# Patient Record
Sex: FEMALE | Race: WHITE
[De-identification: names, ages, dates, MRNs, and addresses within clinical notes are randomized per-mention and may not be internally consistent; named-entity substitution may affect disease eponyms.]

---

## 2019-11-06 ENCOUNTER — HOSPITAL ENCOUNTER (INPATIENT)
Dept: HOSPITAL 53 - M LDI | Age: 24
LOS: 3 days | Discharge: HOME | DRG: 560 | End: 2019-11-09
Admitting: ADVANCED PRACTICE MIDWIFE
Payer: SELF-PAY

## 2019-11-06 VITALS — DIASTOLIC BLOOD PRESSURE: 63 MMHG | SYSTOLIC BLOOD PRESSURE: 102 MMHG

## 2019-11-06 VITALS — DIASTOLIC BLOOD PRESSURE: 83 MMHG | SYSTOLIC BLOOD PRESSURE: 133 MMHG

## 2019-11-06 VITALS — SYSTOLIC BLOOD PRESSURE: 89 MMHG | DIASTOLIC BLOOD PRESSURE: 53 MMHG

## 2019-11-06 VITALS — SYSTOLIC BLOOD PRESSURE: 121 MMHG | DIASTOLIC BLOOD PRESSURE: 80 MMHG

## 2019-11-06 VITALS — HEIGHT: 64 IN | BODY MASS INDEX: 42.49 KG/M2 | WEIGHT: 248.9 LBS

## 2019-11-06 VITALS — DIASTOLIC BLOOD PRESSURE: 86 MMHG | SYSTOLIC BLOOD PRESSURE: 134 MMHG

## 2019-11-06 VITALS — DIASTOLIC BLOOD PRESSURE: 77 MMHG | SYSTOLIC BLOOD PRESSURE: 117 MMHG

## 2019-11-06 VITALS — DIASTOLIC BLOOD PRESSURE: 72 MMHG | SYSTOLIC BLOOD PRESSURE: 117 MMHG

## 2019-11-06 VITALS — SYSTOLIC BLOOD PRESSURE: 110 MMHG | DIASTOLIC BLOOD PRESSURE: 70 MMHG

## 2019-11-06 VITALS — SYSTOLIC BLOOD PRESSURE: 121 MMHG | DIASTOLIC BLOOD PRESSURE: 67 MMHG

## 2019-11-06 VITALS — DIASTOLIC BLOOD PRESSURE: 101 MMHG | SYSTOLIC BLOOD PRESSURE: 143 MMHG

## 2019-11-06 VITALS — SYSTOLIC BLOOD PRESSURE: 114 MMHG | DIASTOLIC BLOOD PRESSURE: 55 MMHG

## 2019-11-06 VITALS — SYSTOLIC BLOOD PRESSURE: 123 MMHG | DIASTOLIC BLOOD PRESSURE: 74 MMHG

## 2019-11-06 DIAGNOSIS — Z3A.39: ICD-10-CM

## 2019-11-06 LAB
ALT SERPL W P-5'-P-CCNC: 25 U/L (ref 12–78)
BILIRUB SERPL-MCNC: 0.2 MG/DL (ref 0.2–1)
CREAT SERPL-MCNC: 0.57 MG/DL (ref 0.55–1.3)
CREAT UR-MCNC: 109 MG/DL
GFR SERPL CREATININE-BSD FRML MDRD: > 60 ML/MIN/{1.73_M2} (ref 60–?)
HCT VFR BLD AUTO: 39.5 % (ref 36–47)
HGB BLD-MCNC: 13.4 G/DL (ref 12–15.5)
LDH SERPL L TO P-CCNC: 218 U/L (ref 84–246)
MCH RBC QN AUTO: 27.9 PG (ref 27–33)
MCHC RBC AUTO-ENTMCNC: 33.9 G/DL (ref 32–36.5)
MCV RBC AUTO: 82.3 FL (ref 80–96)
PLATELET # BLD AUTO: 268 10^3/UL (ref 150–450)
PROT UR-MCNC: 16.2 MG/DL (ref 0–12)
RBC # BLD AUTO: 4.8 10^6/UL (ref 4–5.4)
URATE SERPL-MCNC: 3.6 MG/DL (ref 2.6–6)
WBC # BLD AUTO: 12 10^3/UL (ref 4–10)

## 2019-11-06 PROCEDURE — 3E033VJ INTRODUCTION OF OTHER HORMONE INTO PERIPHERAL VEIN, PERCUTANEOUS APPROACH: ICD-10-PCS | Performed by: ADVANCED PRACTICE MIDWIFE

## 2019-11-06 RX ADMIN — MISOPROSTOL SCH MCG: 100 TABLET ORAL at 22:09

## 2019-11-06 RX ADMIN — MISOPROSTOL SCH MCG: 100 TABLET ORAL at 18:07

## 2019-11-06 NOTE — HPE
DATE OF ADMISSION:  2019

 

Trixie is a 23-year-old  1, para 0, at 39+ weeks gestation, She has an

estimated date of confinement (EDC) of 2019 based on her last menstrual

period and supported by second trimester ultrasound.  She presents to labor and

delivery for induction of labor due to gestational hypertension.  She is of [facility]

culture.  She does report some scant bloody show today and an occasional mild

contraction.  She denies leakage of fluid and the fetus has been active.  Her

prenatal care was initiated at 22 weeks.  She had a three office visits at [facility], noted to have elevated blood pressure at 29 weeks gestation.

No pre-eclamptic labs with a spot urine were performed at that time.

 

PRENATAL LABORATORIES:

A negative, antibody screen negative, rubella nonimmune, VDRL nonreactive.  Urine

culture contaminated.  Hepatitis B surface antigen negative, HIV negative.

Hepatitis C antibody nonreactive.  Gonorrhea and chlamydia negative.  She did not

have her group B Streptococcus (GBS) obtained.  She did not have a repeat urine

culture.  No gestational diabetic screening performed as well.

 

OBSTETRICAL HISTORY:  Primigravida.

 

PAST MEDICAL HISTORY:

1.  Anxiety and depression.

2.  Childhood pertussis and varicella.

 

SURGERIES:  None.

 

FAMILY HISTORY:  Hypertension.

 

SOCIAL HISTORY:  The patient is .  Her  and her mother at bedside

and supportive.  She is a nonsmoker.  She denies alcohol and drug use.  No

history of any sexually transmitted infections and no history of abuse.

 

ALLERGIES:  No known drug allergies.

 

CURRENT MEDICATIONS:

-  magnesium

-  calcium

-  acidophilus

-  multivitamin

 

OBJECTIVE:

Complete vital signs have not been obtained at this time.  Blood pressure

143/101.  Repeat blood pressure 114/55.  Fetal heart rate is 130 with moderate

variability, positive accelerations, negative decelerations.  There is no pattern

of regular contractions.  Her abdomen is gravid, cephalic presentation.

Estimated fetal weight 8 pounds.

STERILE VAGINAL EXAM:  1 cm dilated, 80% effaced, ballottable station, posterior

and soft.  No show with the exam.  Cephalic presentation was confirmed with

bedside ultrasound.

 

ASSESSMENT:

Intrauterine pregnancy at 39+ weeks gestation.  Fetal heart rate category one.

Gestational hypertension.

 

PLAN:

Admit the patient to labor and delivery.  Routine labs with the addition of

pre-eclamptic profile.  Out of bed ad lamont.  Regular diet at this time.  Will

start antibiotics for GBS unknown prophylaxis once the patient is in active

labor.  I plan misoprostol 50 mcg by mouth every 4 hours for cervical ripening.

Likely will start Pitocin.  The patient is uncertain how she will cope with her

labor at this time.  We did discuss a rupture of membranes (ROM) to augment her

labor when she is in active labor.  The patient and her family have had their

questions answered.  Risks, benefits and alternatives have been reviewed.  She

has been verbally consented for emergency surgery and blood products, if

necessary.  I do anticipate cervical ripening.

## 2019-11-07 VITALS — DIASTOLIC BLOOD PRESSURE: 64 MMHG | SYSTOLIC BLOOD PRESSURE: 120 MMHG

## 2019-11-07 VITALS — SYSTOLIC BLOOD PRESSURE: 119 MMHG | DIASTOLIC BLOOD PRESSURE: 75 MMHG

## 2019-11-07 VITALS — SYSTOLIC BLOOD PRESSURE: 141 MMHG | DIASTOLIC BLOOD PRESSURE: 84 MMHG

## 2019-11-07 VITALS — DIASTOLIC BLOOD PRESSURE: 87 MMHG | SYSTOLIC BLOOD PRESSURE: 135 MMHG

## 2019-11-07 VITALS — DIASTOLIC BLOOD PRESSURE: 70 MMHG | SYSTOLIC BLOOD PRESSURE: 119 MMHG

## 2019-11-07 VITALS — SYSTOLIC BLOOD PRESSURE: 114 MMHG | DIASTOLIC BLOOD PRESSURE: 84 MMHG

## 2019-11-07 VITALS — DIASTOLIC BLOOD PRESSURE: 83 MMHG | SYSTOLIC BLOOD PRESSURE: 115 MMHG

## 2019-11-07 VITALS — DIASTOLIC BLOOD PRESSURE: 85 MMHG | SYSTOLIC BLOOD PRESSURE: 150 MMHG

## 2019-11-07 VITALS — DIASTOLIC BLOOD PRESSURE: 91 MMHG | SYSTOLIC BLOOD PRESSURE: 125 MMHG

## 2019-11-07 VITALS — SYSTOLIC BLOOD PRESSURE: 111 MMHG | DIASTOLIC BLOOD PRESSURE: 57 MMHG

## 2019-11-07 VITALS — SYSTOLIC BLOOD PRESSURE: 133 MMHG | DIASTOLIC BLOOD PRESSURE: 91 MMHG

## 2019-11-07 VITALS — SYSTOLIC BLOOD PRESSURE: 124 MMHG | DIASTOLIC BLOOD PRESSURE: 76 MMHG

## 2019-11-07 VITALS — SYSTOLIC BLOOD PRESSURE: 137 MMHG | DIASTOLIC BLOOD PRESSURE: 89 MMHG

## 2019-11-07 VITALS — SYSTOLIC BLOOD PRESSURE: 113 MMHG | DIASTOLIC BLOOD PRESSURE: 68 MMHG

## 2019-11-07 VITALS — SYSTOLIC BLOOD PRESSURE: 144 MMHG | DIASTOLIC BLOOD PRESSURE: 72 MMHG

## 2019-11-07 VITALS — SYSTOLIC BLOOD PRESSURE: 154 MMHG | DIASTOLIC BLOOD PRESSURE: 89 MMHG

## 2019-11-07 VITALS — SYSTOLIC BLOOD PRESSURE: 134 MMHG | DIASTOLIC BLOOD PRESSURE: 63 MMHG

## 2019-11-07 VITALS — SYSTOLIC BLOOD PRESSURE: 118 MMHG | DIASTOLIC BLOOD PRESSURE: 81 MMHG

## 2019-11-07 VITALS — SYSTOLIC BLOOD PRESSURE: 140 MMHG | DIASTOLIC BLOOD PRESSURE: 94 MMHG

## 2019-11-07 VITALS — DIASTOLIC BLOOD PRESSURE: 68 MMHG | SYSTOLIC BLOOD PRESSURE: 114 MMHG

## 2019-11-07 VITALS — SYSTOLIC BLOOD PRESSURE: 124 MMHG | DIASTOLIC BLOOD PRESSURE: 78 MMHG

## 2019-11-07 VITALS — DIASTOLIC BLOOD PRESSURE: 71 MMHG | SYSTOLIC BLOOD PRESSURE: 131 MMHG

## 2019-11-07 VITALS — DIASTOLIC BLOOD PRESSURE: 66 MMHG | SYSTOLIC BLOOD PRESSURE: 98 MMHG

## 2019-11-07 VITALS — DIASTOLIC BLOOD PRESSURE: 89 MMHG | SYSTOLIC BLOOD PRESSURE: 149 MMHG

## 2019-11-07 VITALS — DIASTOLIC BLOOD PRESSURE: 65 MMHG | SYSTOLIC BLOOD PRESSURE: 123 MMHG

## 2019-11-07 VITALS — DIASTOLIC BLOOD PRESSURE: 74 MMHG | SYSTOLIC BLOOD PRESSURE: 121 MMHG

## 2019-11-07 VITALS — DIASTOLIC BLOOD PRESSURE: 66 MMHG | SYSTOLIC BLOOD PRESSURE: 107 MMHG

## 2019-11-07 VITALS — SYSTOLIC BLOOD PRESSURE: 97 MMHG | DIASTOLIC BLOOD PRESSURE: 50 MMHG

## 2019-11-07 VITALS — DIASTOLIC BLOOD PRESSURE: 100 MMHG | SYSTOLIC BLOOD PRESSURE: 133 MMHG

## 2019-11-07 VITALS — DIASTOLIC BLOOD PRESSURE: 69 MMHG | SYSTOLIC BLOOD PRESSURE: 112 MMHG

## 2019-11-07 VITALS — SYSTOLIC BLOOD PRESSURE: 113 MMHG | DIASTOLIC BLOOD PRESSURE: 63 MMHG

## 2019-11-07 VITALS — DIASTOLIC BLOOD PRESSURE: 85 MMHG | SYSTOLIC BLOOD PRESSURE: 121 MMHG

## 2019-11-07 VITALS — SYSTOLIC BLOOD PRESSURE: 129 MMHG | DIASTOLIC BLOOD PRESSURE: 80 MMHG

## 2019-11-07 VITALS — SYSTOLIC BLOOD PRESSURE: 169 MMHG | DIASTOLIC BLOOD PRESSURE: 75 MMHG

## 2019-11-07 VITALS — SYSTOLIC BLOOD PRESSURE: 125 MMHG | DIASTOLIC BLOOD PRESSURE: 80 MMHG

## 2019-11-07 VITALS — SYSTOLIC BLOOD PRESSURE: 113 MMHG | DIASTOLIC BLOOD PRESSURE: 84 MMHG

## 2019-11-07 VITALS — SYSTOLIC BLOOD PRESSURE: 117 MMHG | DIASTOLIC BLOOD PRESSURE: 69 MMHG

## 2019-11-07 VITALS — SYSTOLIC BLOOD PRESSURE: 101 MMHG | DIASTOLIC BLOOD PRESSURE: 64 MMHG

## 2019-11-07 VITALS — DIASTOLIC BLOOD PRESSURE: 76 MMHG | SYSTOLIC BLOOD PRESSURE: 126 MMHG

## 2019-11-07 VITALS — SYSTOLIC BLOOD PRESSURE: 145 MMHG | DIASTOLIC BLOOD PRESSURE: 88 MMHG

## 2019-11-07 VITALS — SYSTOLIC BLOOD PRESSURE: 141 MMHG | DIASTOLIC BLOOD PRESSURE: 88 MMHG

## 2019-11-07 VITALS — DIASTOLIC BLOOD PRESSURE: 79 MMHG | SYSTOLIC BLOOD PRESSURE: 114 MMHG

## 2019-11-07 VITALS — DIASTOLIC BLOOD PRESSURE: 60 MMHG | SYSTOLIC BLOOD PRESSURE: 118 MMHG

## 2019-11-07 VITALS — SYSTOLIC BLOOD PRESSURE: 125 MMHG | DIASTOLIC BLOOD PRESSURE: 75 MMHG

## 2019-11-07 VITALS — DIASTOLIC BLOOD PRESSURE: 87 MMHG | SYSTOLIC BLOOD PRESSURE: 144 MMHG

## 2019-11-07 VITALS — DIASTOLIC BLOOD PRESSURE: 51 MMHG | SYSTOLIC BLOOD PRESSURE: 104 MMHG

## 2019-11-07 VITALS — SYSTOLIC BLOOD PRESSURE: 117 MMHG | DIASTOLIC BLOOD PRESSURE: 84 MMHG

## 2019-11-07 VITALS — DIASTOLIC BLOOD PRESSURE: 82 MMHG | SYSTOLIC BLOOD PRESSURE: 128 MMHG

## 2019-11-07 VITALS — SYSTOLIC BLOOD PRESSURE: 134 MMHG | DIASTOLIC BLOOD PRESSURE: 82 MMHG

## 2019-11-07 VITALS — DIASTOLIC BLOOD PRESSURE: 85 MMHG | SYSTOLIC BLOOD PRESSURE: 137 MMHG

## 2019-11-07 VITALS — DIASTOLIC BLOOD PRESSURE: 81 MMHG | SYSTOLIC BLOOD PRESSURE: 119 MMHG

## 2019-11-07 VITALS — DIASTOLIC BLOOD PRESSURE: 84 MMHG | SYSTOLIC BLOOD PRESSURE: 129 MMHG

## 2019-11-07 VITALS — SYSTOLIC BLOOD PRESSURE: 113 MMHG | DIASTOLIC BLOOD PRESSURE: 72 MMHG

## 2019-11-07 VITALS — SYSTOLIC BLOOD PRESSURE: 105 MMHG | DIASTOLIC BLOOD PRESSURE: 70 MMHG

## 2019-11-07 RX ADMIN — Medication SCH MLS/HR: at 21:00

## 2019-11-07 RX ADMIN — MISOPROSTOL SCH MCG: 100 TABLET ORAL at 06:15

## 2019-11-07 RX ADMIN — SODIUM CHLORIDE, POTASSIUM CHLORIDE, SODIUM LACTATE AND CALCIUM CHLORIDE SCH MLS/HR: 600; 310; 30; 20 INJECTION, SOLUTION INTRAVENOUS at 17:22

## 2019-11-07 RX ADMIN — MISOPROSTOL SCH MCG: 100 TABLET ORAL at 02:14

## 2019-11-07 RX ADMIN — SODIUM CHLORIDE, POTASSIUM CHLORIDE, SODIUM LACTATE AND CALCIUM CHLORIDE SCH MLS/HR: 600; 310; 30; 20 INJECTION, SOLUTION INTRAVENOUS at 10:23

## 2019-11-07 RX ADMIN — Medication SCH MLS/HR: at 16:58

## 2019-11-08 VITALS — DIASTOLIC BLOOD PRESSURE: 65 MMHG | SYSTOLIC BLOOD PRESSURE: 126 MMHG

## 2019-11-08 VITALS — DIASTOLIC BLOOD PRESSURE: 65 MMHG | SYSTOLIC BLOOD PRESSURE: 132 MMHG

## 2019-11-08 VITALS — SYSTOLIC BLOOD PRESSURE: 120 MMHG | DIASTOLIC BLOOD PRESSURE: 78 MMHG

## 2019-11-08 VITALS — DIASTOLIC BLOOD PRESSURE: 67 MMHG | SYSTOLIC BLOOD PRESSURE: 128 MMHG

## 2019-11-08 VITALS — SYSTOLIC BLOOD PRESSURE: 128 MMHG | DIASTOLIC BLOOD PRESSURE: 59 MMHG

## 2019-11-08 VITALS — DIASTOLIC BLOOD PRESSURE: 73 MMHG | SYSTOLIC BLOOD PRESSURE: 117 MMHG

## 2019-11-08 VITALS — DIASTOLIC BLOOD PRESSURE: 64 MMHG | SYSTOLIC BLOOD PRESSURE: 119 MMHG

## 2019-11-08 VITALS — SYSTOLIC BLOOD PRESSURE: 121 MMHG | DIASTOLIC BLOOD PRESSURE: 61 MMHG

## 2019-11-08 VITALS — SYSTOLIC BLOOD PRESSURE: 111 MMHG | DIASTOLIC BLOOD PRESSURE: 60 MMHG

## 2019-11-08 VITALS — DIASTOLIC BLOOD PRESSURE: 86 MMHG | SYSTOLIC BLOOD PRESSURE: 137 MMHG

## 2019-11-08 VITALS — DIASTOLIC BLOOD PRESSURE: 58 MMHG | SYSTOLIC BLOOD PRESSURE: 107 MMHG

## 2019-11-08 VITALS — DIASTOLIC BLOOD PRESSURE: 65 MMHG | SYSTOLIC BLOOD PRESSURE: 127 MMHG

## 2019-11-08 VITALS — SYSTOLIC BLOOD PRESSURE: 134 MMHG | DIASTOLIC BLOOD PRESSURE: 60 MMHG

## 2019-11-08 VITALS — DIASTOLIC BLOOD PRESSURE: 65 MMHG | SYSTOLIC BLOOD PRESSURE: 123 MMHG

## 2019-11-08 VITALS — DIASTOLIC BLOOD PRESSURE: 79 MMHG | SYSTOLIC BLOOD PRESSURE: 137 MMHG

## 2019-11-08 VITALS — DIASTOLIC BLOOD PRESSURE: 58 MMHG | SYSTOLIC BLOOD PRESSURE: 122 MMHG

## 2019-11-08 VITALS — SYSTOLIC BLOOD PRESSURE: 140 MMHG | DIASTOLIC BLOOD PRESSURE: 80 MMHG

## 2019-11-08 VITALS — SYSTOLIC BLOOD PRESSURE: 128 MMHG | DIASTOLIC BLOOD PRESSURE: 61 MMHG

## 2019-11-08 VITALS — DIASTOLIC BLOOD PRESSURE: 112 MMHG | SYSTOLIC BLOOD PRESSURE: 144 MMHG

## 2019-11-08 VITALS — DIASTOLIC BLOOD PRESSURE: 64 MMHG | SYSTOLIC BLOOD PRESSURE: 127 MMHG

## 2019-11-08 VITALS — SYSTOLIC BLOOD PRESSURE: 124 MMHG | DIASTOLIC BLOOD PRESSURE: 81 MMHG

## 2019-11-08 PROCEDURE — 0HQ9XZZ REPAIR PERINEUM SKIN, EXTERNAL APPROACH: ICD-10-PCS

## 2019-11-08 RX ADMIN — Medication SCH TAB: at 09:00

## 2019-11-08 RX ADMIN — Medication SCH MLS/HR: at 01:38

## 2019-11-08 RX ADMIN — Medication SCH TAB: at 09:53

## 2019-11-08 NOTE — DN
DATE OF DELIVERY:  2019

 

PREDELIVERY DIAGNOSIS:  39+ weeks gestation, chronic hypertension, labor

induction.

 

POSTDELIVERY DIAGNOSIS:  Delivered.

 

PROCEDURE:  Spontaneous vaginal delivery.

 

OBSTETRICIAN:  Ivan Murillo MD

 

ANESTHESIA:  Epidural.

 

ESTIMATED BLOOD LOSS:  300 mL.

 

FINDINGS:  6 pound 13 ounce female infant, Apgar score 7 and 8.

 

DELIVERY SUMMARY:  After approximately a 2 hour and 20 minute second stage, the

patient spontaneously delivered a 6 pound 13 ounce female infant, under epidural

anesthesia.  There was no nuchal cord.  The shoulders delivered with ease.  The

infant was handed to the mother.  The cord was doubly clamped and cut.

 

The placenta delivered spontaneously and appeared to be intact.  The patient

received intravenous (IV) pitocin immediately after delivery of the placenta.

Uterine atony was encountered.  It was treated with fundal massage and 800 mcg of

Cytotec per rectum.  Good hemostasis was then noted.

 

First-degree perineal laceration was repaired with #2-0 chromic in the usual

fashion.  Sponge and needle counts were correct.

## 2019-11-09 VITALS — SYSTOLIC BLOOD PRESSURE: 111 MMHG | DIASTOLIC BLOOD PRESSURE: 78 MMHG

## 2019-11-09 RX ADMIN — Medication SCH TAB: at 07:48

## 2020-03-13 ENCOUNTER — HOSPITAL ENCOUNTER (EMERGENCY)
Dept: HOSPITAL 53 - M ED | Age: 25
Discharge: HOME | End: 2020-03-13
Payer: SELF-PAY

## 2020-03-13 VITALS — DIASTOLIC BLOOD PRESSURE: 78 MMHG | SYSTOLIC BLOOD PRESSURE: 120 MMHG

## 2020-03-13 VITALS — HEIGHT: 62 IN | WEIGHT: 238.32 LBS | BODY MASS INDEX: 43.86 KG/M2

## 2020-03-13 DIAGNOSIS — Z88.6: ICD-10-CM

## 2020-03-13 DIAGNOSIS — Z33.1: ICD-10-CM

## 2020-03-13 DIAGNOSIS — Z79.899: ICD-10-CM

## 2020-03-13 DIAGNOSIS — J10.1: Primary | ICD-10-CM

## 2020-03-13 LAB
FLUAV RNA UPPER RESP QL NAA+PROBE: NEGATIVE
FLUBV RNA UPPER RESP QL NAA+PROBE: POSITIVE

## 2020-09-07 ENCOUNTER — HOSPITAL ENCOUNTER (INPATIENT)
Dept: HOSPITAL 53 - M LDO | Age: 25
LOS: 2 days | Discharge: HOME | DRG: 540 | End: 2020-09-09
Attending: OBSTETRICS & GYNECOLOGY | Admitting: OBSTETRICS & GYNECOLOGY
Payer: SELF-PAY

## 2020-09-07 VITALS — SYSTOLIC BLOOD PRESSURE: 123 MMHG | DIASTOLIC BLOOD PRESSURE: 61 MMHG

## 2020-09-07 VITALS — BODY MASS INDEX: 42.42 KG/M2 | HEIGHT: 64 IN | WEIGHT: 248.46 LBS

## 2020-09-07 VITALS — SYSTOLIC BLOOD PRESSURE: 126 MMHG | DIASTOLIC BLOOD PRESSURE: 70 MMHG

## 2020-09-07 VITALS — DIASTOLIC BLOOD PRESSURE: 59 MMHG | SYSTOLIC BLOOD PRESSURE: 106 MMHG

## 2020-09-07 VITALS — DIASTOLIC BLOOD PRESSURE: 63 MMHG | SYSTOLIC BLOOD PRESSURE: 113 MMHG

## 2020-09-07 VITALS — DIASTOLIC BLOOD PRESSURE: 79 MMHG | SYSTOLIC BLOOD PRESSURE: 135 MMHG

## 2020-09-07 VITALS — DIASTOLIC BLOOD PRESSURE: 53 MMHG | SYSTOLIC BLOOD PRESSURE: 101 MMHG

## 2020-09-07 VITALS — DIASTOLIC BLOOD PRESSURE: 62 MMHG | SYSTOLIC BLOOD PRESSURE: 100 MMHG

## 2020-09-07 DIAGNOSIS — Z3A.00: ICD-10-CM

## 2020-09-07 LAB
BASE EXCESS BLDCOA CALC-SCNC: -4.2 MMOL/L
BASE EXCESS BLDCOV CALC-SCNC: -1.8 MMOL/L
BASOPHILS # BLD AUTO: 0.1 10^3/UL (ref 0–0.2)
BASOPHILS NFR BLD AUTO: 0.4 % (ref 0–1)
CO2 BLDCOA CALC-SCNC: 26.5 MEQ/L
CO2 BLDCOV CALC-SCNC: 25.7 MEQ/L
EOSINOPHIL # BLD AUTO: 0.1 10^3/UL (ref 0–0.5)
EOSINOPHIL NFR BLD AUTO: 0.7 % (ref 0–3)
HCO3 STD BLDCOA-SCNC: 19.4 MEQ/L
HCO3 STD BLDCOA-SCNC: 24.5 MEQ/L
HCO3 STD BLDCOV-SCNC: 22.2 MEQ/L
HCO3 STD BLDCOV-SCNC: 24.3 MEQ/L
HCT VFR BLD AUTO: 39 % (ref 36–47)
HGB BLD-MCNC: 13.5 G/DL (ref 12–15.5)
LYMPHOCYTES # BLD AUTO: 2.3 10^3/UL (ref 1.5–5)
LYMPHOCYTES NFR BLD AUTO: 19.9 % (ref 24–44)
MCH RBC QN AUTO: 28.8 PG (ref 27–33)
MCHC RBC AUTO-ENTMCNC: 34.6 G/DL (ref 32–36.5)
MCV RBC AUTO: 83.2 FL (ref 80–96)
MONOCYTES # BLD AUTO: 0.7 10^3/UL (ref 0–0.8)
MONOCYTES NFR BLD AUTO: 5.7 % (ref 0–5)
NEUTROPHILS # BLD AUTO: 8.5 10^3/UL (ref 1.5–8.5)
NEUTROPHILS NFR BLD AUTO: 73 % (ref 36–66)
PCO2 BLDCOA: 62.6 MMHG
PCO2 BLDCOV: 46.3 MMHG
PH BLDCOA: 7.21 UNITS
PH BLDCOV: 7.34 UNITS
PLATELET # BLD AUTO: 265 10^3/UL (ref 150–450)
PO2 BLDCOA: 14 MMHG
PO2 BLDCOV: 25.3 MMHG
RBC # BLD AUTO: 4.69 10^6/UL (ref 4–5.4)
SAO2 % BLDCOA: 18.9 %
SAO2 % BLDCOV: 60.2 %
WBC # BLD AUTO: 11.3 10^3/UL (ref 4–10)

## 2020-09-07 RX ADMIN — DOCUSATE SODIUM SCH MG: 100 CAPSULE, LIQUID FILLED ORAL at 20:25

## 2020-09-07 NOTE — IPNPDOC
Obstetrical Progress Note


Date of Service


Sep 7, 2020





Subjective


Assessment in setting of potential emergency: to room for report of possible 

malpresentation with limbs beyond the cervix by midwife attempting a home birth 

with Yusuf patient. Patient reports she ruptured at 0700 yesterday and has not 

had painful contractions. She does not know her LMP as she has not had once 

since breastfeeding. She last delivered in 2019.





Fetal Assessment


Fetal Heart Rate (FHR):  140


Variability:  Moderate


Accelerations:  Positive


Decelerations:  None


Fetal Heart Rate Tracing:  Category I





Tocometer


Contractions:  Yes





Sterile Vaginal Examination


Dilation:  3 cm


Effacement (%):  90%


Station:  -3


Cervical Consistency:  Soft


Cervical Position:  Anterior


Fetal Postion/Presentation:  Breech presentation





Assessment and Plan


Fetal Status:  Reassuring


Additional Comments


23yo  with unsure dates but reports term. To room for notification of 

potential malpresentation with limbs beyond the cervix. SVE 3/90/H, grossly 

ruptured (since 0700 per patient) without si/sx of infection. On exam fetal hand

can be felt at the level of the cervix but not beyond. The TAUS showed the fetus

in breech presentation. Once fetal tracing was established the baby was noted to

be CAT I. Given lack of labor progression per midwife and CAT I tracing c-

section would be recommended urgently not emergently. Called Dr. Durbin to 

notify and signed out above information.











HUMES,JAMIE C. DO               Sep 7, 2020 10:10

## 2020-09-08 VITALS — SYSTOLIC BLOOD PRESSURE: 107 MMHG | DIASTOLIC BLOOD PRESSURE: 57 MMHG

## 2020-09-08 VITALS — DIASTOLIC BLOOD PRESSURE: 58 MMHG | SYSTOLIC BLOOD PRESSURE: 102 MMHG

## 2020-09-08 VITALS — SYSTOLIC BLOOD PRESSURE: 121 MMHG | DIASTOLIC BLOOD PRESSURE: 77 MMHG

## 2020-09-08 VITALS — SYSTOLIC BLOOD PRESSURE: 107 MMHG | DIASTOLIC BLOOD PRESSURE: 51 MMHG

## 2020-09-08 VITALS — DIASTOLIC BLOOD PRESSURE: 57 MMHG | SYSTOLIC BLOOD PRESSURE: 105 MMHG

## 2020-09-08 VITALS — SYSTOLIC BLOOD PRESSURE: 102 MMHG | DIASTOLIC BLOOD PRESSURE: 60 MMHG

## 2020-09-08 LAB — HCV AB SER QL: 0.2 INDEX (ref ?–0.8)

## 2020-09-08 RX ADMIN — Medication SCH TAB: at 08:22

## 2020-09-08 RX ADMIN — IBUPROFEN PRN MG: 800 TABLET, FILM COATED ORAL at 17:38

## 2020-09-08 RX ADMIN — DOCUSATE SODIUM SCH MG: 100 CAPSULE, LIQUID FILLED ORAL at 20:01

## 2020-09-08 RX ADMIN — DOCUSATE SODIUM SCH MG: 100 CAPSULE, LIQUID FILLED ORAL at 08:22

## 2020-09-09 VITALS — DIASTOLIC BLOOD PRESSURE: 74 MMHG | SYSTOLIC BLOOD PRESSURE: 119 MMHG

## 2020-09-09 VITALS — SYSTOLIC BLOOD PRESSURE: 111 MMHG | DIASTOLIC BLOOD PRESSURE: 62 MMHG

## 2020-09-09 VITALS — DIASTOLIC BLOOD PRESSURE: 76 MMHG | SYSTOLIC BLOOD PRESSURE: 118 MMHG

## 2020-09-09 RX ADMIN — DOCUSATE SODIUM SCH MG: 100 CAPSULE, LIQUID FILLED ORAL at 08:32

## 2020-09-09 RX ADMIN — Medication SCH TAB: at 08:32

## 2020-09-09 RX ADMIN — IBUPROFEN PRN MG: 800 TABLET, FILM COATED ORAL at 12:54

## 2020-09-09 NOTE — OBDS
[facility] Obstetrical Discharge Sum.


Obstetrical Discharge Summary


Obstetrician/Provider:  Yobany Durbin DO


Date:  Sep 9, 2020


:  2


Term:  2


Pre-term:  0


Abortions:  0


Livin


Infant Sex:  Female


Anesthesia:  Regional Anesthesia





A/P, Post Partum Course


List any complications


Admission diagnosis: Term pregnancy with SROM and Breech; No prenatal care.


Discharge diagnosis: Same


Condition at Discharge: [Stable]


Discharge Instructions: [Home/other]


Activity: [As tolerated]


Diet: [Regular]


Medications: [May continue home med, tylenol as needed]


Follow-up: [2 weeks]


Other:











Yobany Durbin DO                Sep 9, 2020 03:34

## 2020-09-09 NOTE — HPE
DATE OF ADMISSION:   2020



Trixie is a 24-year-old female,  2, para 1-0-0-1, Jain young lady with no
formal prenatal care who presented to labor and delivery after reporting being 
spontaneously ruptured last night around 9 o'clock. She labored at home and was 
seen by a midwife and was told she had a breech presentation. She presented to 
labor and delivery. Upon evaluation, she was found to be in active labor with a 
fetal hand presenting in a breech presentation. After extensive consult, a 
decision was made to proceed with a  section. She has no prenatal record
for review. She did deliver has last pregnancy at the hospital. 



MEDICAL HISTORY:  She denies.



PAST SURGICAL HISTORY: Denies.



SOCIAL HISTORY: She is an Jain young lady. Denies any alcohol or drug use.



REVIEW OF SYSTEMS: Unremarkable.



MEDICATIONS: None. 



ALLERGIES: No known drug allergies.



PHYSICAL EXAMINATION:  Normal-appearing Jain lady, mildly obese in no acute 
distress. Abdomen soft, nontender, nondistended, gravid. Extremities: No 
clubbing, cyanosis, or edema. Vaginal exam:  Grossly ruptured with a fetal hand 
presenting at the level of the cervix, which would appear as loulou breech 
presentation. No bleeding. No cord presenting. Her tracing reviewed. Category 1 
tracing with contractions every 5-6 minutes. 



ASSESSMENT:  

1. Gross rupture of membranes. Appears to be at term but no formal prenatal 
care. As per patient, had a due date of . 

2. Breech presentation.

3. No prenatal care. 



PLAN:  Admit to labor and delivery. Routine labs sent. Patient and her  
counseled extensively. We will proceed with delivery via  section. Risks
and benefits of the surgery discussed with the patient as well as the 
alternative.

HELADIO

## 2020-09-24 NOTE — RO
DATE OF OPERATION: 2020



INDICATIONS: Trixie is a 24-year-old female,  2, para 1,0,0,1, young lady 
with no prenatal care presented to Labor and Delivery in active labor with 
spontaneous rupture of membranes and breech presentation. After consulting the 
decision was made to proceed with primary  section.



PREOPERATIVE DIAGNOSES: 

* Breech presentation in active labor.

* Spontaneous rupture of membranes.

* No prenatal care.



POSTOPERATIVE DIAGNOSES: 

* Breech presentation in active labor.

* Spontaneous rupture of membranes.

* No prenatal care.

* Double footling breech with hand presentation.



FINDINGS: Live female infant, Apgars 9 and 9, birth weight 6 pounds 7 ounces. 
Normal appearing tubes, ovaries, and placenta.



PROCEDURE:  Primary low transverse  section, Pfannenstiel incision.



ANESTHESIA: Spinal.



SURGEON: Yobany Durbin D.O.



COMPLICATIONS: None.



ESTIMATED BLOOD LOSS: 600 mL. 



DESCRIPTION OF PROCEDURE: After obtaining informed consent, the patient was 
taken to the Operating Room where spinal anesthetic was found to be adequate. 
She was then draped and prepped in the usual sterile fashion in the supine 
position. At this point a Pfannenstiel incision was made. This was carried down 
to the fascia. The fascia was incised in the midline fashion and carried through
laterally. The superior aspect of the fascia was grasped with Kocher clamps, 
tented off and dissected off the rectus muscle sharply. The inferior aspect was 
dissected off in similar fashion. Rectus muscle was  in midline 
fashion.  Peritoneum was identified. Peritoneal cavity was entered bluntly. 
Superior and inferior dissection of the peritoneum was then done with good 
visualization of the bladder. At this point a Mbius skin retractor was placed. 
A low transverse uterine incision was made. The infant was delivered in an 
atraumatic fashion, nose and mouth bulb suctioned, cord clamped and cut, and 
infant was handed over to the waiting warmer. Cord blood and cord gas was sent. 
Placenta removed manually. Uterus cleared of all clots and debris. Uterine 
incision was then repaired in two separate layers of #0 Vicryl suture. Attention
was turned to the peritoneum which was closed in a running fashion using 2-0 
Vicryl. The fascia was closed in two separate segments of #0 Vicryl sutures. All
superficial bleeders were coagulated. The skin was reapproximated in a 
subcuticular fashion using 3-0 Vicryl on a Gigi, Steri-Strips placed. Patient 
tolerated the procedure well. She was then transferred to the recovery room in 
stable condition. 



HELADIO

## 2021-05-27 ENCOUNTER — HOSPITAL ENCOUNTER (OUTPATIENT)
Dept: HOSPITAL 53 - M LDO | Age: 26
Discharge: HOME | End: 2021-05-27
Attending: OBSTETRICS & GYNECOLOGY
Payer: SELF-PAY

## 2021-05-27 VITALS — BODY MASS INDEX: 34.91 KG/M2 | HEIGHT: 72 IN | WEIGHT: 257.72 LBS

## 2021-05-27 VITALS — DIASTOLIC BLOOD PRESSURE: 67 MMHG | SYSTOLIC BLOOD PRESSURE: 127 MMHG

## 2021-05-27 DIAGNOSIS — Z3A.24: ICD-10-CM

## 2021-05-27 DIAGNOSIS — O44.32: Primary | ICD-10-CM

## 2021-05-27 DIAGNOSIS — O34.211: ICD-10-CM

## 2021-05-27 DIAGNOSIS — O09.32: ICD-10-CM

## 2021-05-27 DIAGNOSIS — Z88.6: ICD-10-CM

## 2021-05-27 PROCEDURE — 76815 OB US LIMITED FETUS(S): CPT

## 2021-05-27 NOTE — IPNPDOC
Text Note


Date of Service


The patient was seen on 21.





NOTE


24 yo  Yusuf female at 24 weeks gestation by LMP presents with a single 

episode of bleeding early today. She has had no prenatal care. She has had one 

 section.





O: AVSS


NAD


Abd: NT, gravid, FHT cat. I


SSE: cx appears closed, no bleeding, cx appears closed


toco: none





bedside ultrasound: 23 week IUP, transverse lie, placenta previa noted, normal 

ELI, good fetal movement





A/P 24 yo  at 24 weeks with placenta previa 





explained what placenta previa means


pelvic rest


pt encouraged to seek medical care through a hospital for this pregnancy


Pt given phone number for our office





VS,Joni, I+O


VS, Joni, I+O





Vital Signs








  Date Time  Temp Pulse Resp B/P (MAP) Pulse Ox O2 Delivery O2 Flow Rate FiO2


 


21 19:36 97.7 82  127/67 (87)    

















LURDES KASPER MD             May 27, 2021 20:04